# Patient Record
(demographics unavailable — no encounter records)

---
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
## 2025-05-28 NOTE — ASSESSMENT
[FreeTextEntry1] : Impression: Sprain left wrist.  She will continue with as needed use of a cock up Velcro wrist splint along with Motrin for discomfort.  Range of motion exercises at home hopefully return to gym and sports in 2 weeks if he is not better at that point the family will return.

## 2025-05-28 NOTE — PHYSICAL EXAM
[FreeTextEntry1] : Exam today reveals he has good motion to the wrist.  Minimal limitation in all planes minimal swelling if any he has no objective points of bony tenderness there is no instability on stress.  The inferior radial ulnar joint is stable on stress with no clicking or popping on either active or passive rotation of the wrist.  His  strength is good.  Written results of x-rays left [facility] May 24, 2025 are negative